# Patient Record
Sex: MALE | Race: BLACK OR AFRICAN AMERICAN | NOT HISPANIC OR LATINO | Employment: FULL TIME | URBAN - METROPOLITAN AREA
[De-identification: names, ages, dates, MRNs, and addresses within clinical notes are randomized per-mention and may not be internally consistent; named-entity substitution may affect disease eponyms.]

---

## 2023-09-22 ENCOUNTER — HOSPITAL ENCOUNTER (EMERGENCY)
Facility: CLINIC | Age: 23
Discharge: HOME | End: 2023-09-22
Admitting: PHYSICIAN ASSISTANT
Payer: COMMERCIAL

## 2023-09-22 VITALS
SYSTOLIC BLOOD PRESSURE: 125 MMHG | HEIGHT: 71 IN | BODY MASS INDEX: 23.8 KG/M2 | HEART RATE: 88 BPM | TEMPERATURE: 98.2 F | OXYGEN SATURATION: 99 % | WEIGHT: 170 LBS | DIASTOLIC BLOOD PRESSURE: 66 MMHG | RESPIRATION RATE: 18 BRPM

## 2023-09-22 DIAGNOSIS — J03.90 ACUTE TONSILLITIS, UNSPECIFIED ETIOLOGY: ICD-10-CM

## 2023-09-22 DIAGNOSIS — R30.0 DYSURIA: ICD-10-CM

## 2023-09-22 LAB
ALBUMIN UR-MCNC: 10 MG/DL
APPEARANCE UR: CLEAR
BILIRUB UR QL STRIP: NEGATIVE
COLOR UR AUTO: YELLOW
GLUCOSE UR STRIP-MCNC: NEGATIVE MG/DL
GROUP A STREP BY PCR: NOT DETECTED
HGB UR QL STRIP: NEGATIVE
KETONES UR STRIP-MCNC: NEGATIVE MG/DL
LEUKOCYTE ESTERASE UR QL STRIP: NEGATIVE
MUCOUS THREADS #/AREA URNS LPF: PRESENT /LPF
NITRATE UR QL: NEGATIVE
PH UR STRIP: 6 [PH] (ref 5–7)
RBC URINE: 1 /HPF
SP GR UR STRIP: 1.03 (ref 1–1.03)
UROBILINOGEN UR STRIP-MCNC: <2 MG/DL
WBC URINE: <1 /HPF

## 2023-09-22 PROCEDURE — 87591 N.GONORRHOEAE DNA AMP PROB: CPT | Performed by: PHYSICIAN ASSISTANT

## 2023-09-22 PROCEDURE — 87491 CHLMYD TRACH DNA AMP PROBE: CPT | Performed by: PHYSICIAN ASSISTANT

## 2023-09-22 PROCEDURE — 99284 EMERGENCY DEPT VISIT MOD MDM: CPT

## 2023-09-22 PROCEDURE — 250N000011 HC RX IP 250 OP 636: Performed by: PHYSICIAN ASSISTANT

## 2023-09-22 PROCEDURE — 87637 SARSCOV2&INF A&B&RSV AMP PRB: CPT | Performed by: PHYSICIAN ASSISTANT

## 2023-09-22 PROCEDURE — 81001 URINALYSIS AUTO W/SCOPE: CPT | Performed by: EMERGENCY MEDICINE

## 2023-09-22 PROCEDURE — 96372 THER/PROPH/DIAG INJ SC/IM: CPT | Performed by: PHYSICIAN ASSISTANT

## 2023-09-22 PROCEDURE — 87651 STREP A DNA AMP PROBE: CPT | Performed by: EMERGENCY MEDICINE

## 2023-09-22 RX ADMIN — PENICILLIN G BENZATHINE 1.2 MILLION UNITS: 1200000 INJECTION, SUSPENSION INTRAMUSCULAR at 23:08

## 2023-09-22 ASSESSMENT — ENCOUNTER SYMPTOMS
FEVER: 1
SHORTNESS OF BREATH: 0
VOMITING: 0
NAUSEA: 0
HEMATURIA: 0
SORE THROAT: 1
VOICE CHANGE: 0
FREQUENCY: 1
FLANK PAIN: 0
DYSURIA: 1
MYALGIAS: 1
COUGH: 0
TROUBLE SWALLOWING: 0
ABDOMINAL PAIN: 0

## 2023-09-23 LAB
FLUAV RNA SPEC QL NAA+PROBE: NEGATIVE
FLUBV RNA RESP QL NAA+PROBE: NEGATIVE
RSV RNA SPEC NAA+PROBE: NEGATIVE
SARS-COV-2 RNA RESP QL NAA+PROBE: NEGATIVE

## 2023-09-23 NOTE — ED NOTES
Pt was given Pen shot and will remain in the Waiting room for 15 min for eval of any allergy to medication. He will then be able to leave. Pt was given nathan SONG RN

## 2023-09-23 NOTE — ED TRIAGE NOTES
Pt presents to the ED with c/o sore throat that has been going on since Monday. Pt reports that he went to , had a strep test done, and was told it was negative. Pt reports hx of strep throat, states that it feels the same. Pt also concerted of UTI.      Triage Assessment       Row Name 09/22/23 2017       Triage Assessment (Adult)    Airway WDL WDL       Respiratory WDL    Respiratory WDL WDL       Skin Circulation/Temperature WDL    Skin Circulation/Temperature WDL WDL       Cardiac WDL    Cardiac WDL WDL       Peripheral/Neurovascular WDL    Peripheral Neurovascular WDL WDL       Cognitive/Neuro/Behavioral WDL    Cognitive/Neuro/Behavioral WDL WDL

## 2023-09-23 NOTE — ED PROVIDER NOTES
Emergency Department Encounter   NAME: Pino Parker ; AGE: 23 year old male ; YOB: 2000 ; MRN: [id number] ; PCP: No Ref-Primary, Physician   ED PROVIDER: Violet Garcia PA-C    Evaluation Date & Time:   No admission date for patient encounter.    CHIEF COMPLAINT:  Pharyngitis      Impression and Plan   MDM:   Pino Parker is a 23 year old male with a pertinent history of compression fracture of T1 vertebrae, tic, who presents to the ED by private vehicle for evaluation of sore throat and dysuria.  The patient presents to the emergency department for evaluation of 1 week of sore throat which she states feels similar to previous strep pharyngitis.  He was seen in urgent care yesterday and per chart review had a negative strep test.  This was repeated by our triage staff which again returned negative.  Discussed this with the patient.  He does appear to have tonsillitis on exam, and given this did offer him IM Bicillin which she would like to move forward with.  There is no evidence of peritonsillar abscess and certainly nothing to suggest more sinister pathology such as retropharyngeal abscess, epiglottitis or bacterial tracheitis.  Did consider gonococcal tonsillitis, however less likely given negative gonorrhea and Chlamydia urine testing yesterday in clinic.  We will send oral swab.  He is experiencing dysuria after having unprotected insertive anal intercourse with his female partner.  UA shows some mucus though there is no evidence of leukocytosis, nitrates, or bacteria to suggest UTI.  He is not having any testicular pain or scrotal swelling -no concern for epididymitis, orchitis, or torsion.  He denies any rashes or lesions -no concern for herpes.  Patient is planning on having a full STI screening on Thursday which I feel is appropriate.  No indication for more emergent work-up at this time.  We discussed the importance of follow-up as scheduled, concerning signs and symptoms to return  to the ER, and he verbalized understanding.  Discharged home in stable condition.    *Patient examination and workup was initiated in triage due to inpatient hospital and Emergency Department bed shortage resulting in long waiting room wait times. Patient and/or guardian's consent was obtained.       Medical Decision Making    History:  Supplemental history from: Documented in chart, if applicable  External Record(s) reviewed: Outpatient Record:  visit from yesterday    Work Up:  Chart documentation includes differential considered and any EKGs or imaging independently interpreted by provider, where specified.  In additional to work up documented, I considered the following work up: Documented in chart, if applicable.    External consultation:  Discussion of management with another provider: Documented in chart, if applicable    Complicating factors:  Care impacted by chronic illness: N/A  Care affected by social determinants of health: N/A    Disposition considerations: Discharge. I prescribed additional prescription strength medication(s) as charted. See documentation for any additional details.      ED COURSE:  10:08 PM I met and introduced myself to the patient. I gathered initial history and performed my physical exam. We discussed results, discharge, follow up, and reasons to return to the ED.     At the conclusion of the encounter I discussed the results of all the tests and the disposition. The questions were answered. The patient or family acknowledged understanding and was agreeable with the care plan.    FINAL IMPRESSION:    ICD-10-CM    1. Acute tonsillitis, unspecified etiology  J03.90       2. Dysuria  R30.0             MEDICATIONS GIVEN IN THE EMERGENCY DEPARTMENT:  Medications   penicillin G benzathine (BICILLIN L-A) injection 1.2 Million Units (has no administration in time range)         NEW PRESCRIPTIONS STARTED AT TODAY'S ED VISIT:  New Prescriptions    No medications on file         HPI  "  Patient information was obtained from: Patient    Use of Intrepreter: N/A     Pino Parker is a 23 year old male with a pertinent history of compression fracture of T1 vertebrae, tic, who presents to the ED by private vehicle for evaluation of sore throat and dysuria.     The patient presents to the emergency department for evaluation of sore throat for 1 week.  He states he has had strep throat multiple times in the past and his symptoms feel similar.  He felt feverish initially and had body aches which have since resolved.  He has pain with swallowing, his throat feels dry in the morning, though tends to feel better throughout the day.  He was at urgent care yesterday and had a negative strep test though wanted this repeated today.  He has not had a COVID swab.    Additionally, patient reports that he had unprotected insertive anal intercourse with his female partner, and has had burning with urination ever since.  He provided a urine sample at your urgent care yesterday which was negative for gonorrhea and chlamydia and he is set up for a \"full STI panel\" on Thursday.  He has not had nausea, vomiting, belly or back pain.  He denies any genital rashes, testicular pain or swelling.    REVIEW OF SYSTEMS:  Review of Systems   Constitutional:  Positive for fever (subjective; resolved).   HENT:  Positive for sore throat. Negative for ear pain, trouble swallowing and voice change.    Respiratory:  Negative for cough and shortness of breath.    Cardiovascular:  Negative for chest pain.   Gastrointestinal:  Negative for abdominal pain, nausea and vomiting.   Genitourinary:  Positive for dysuria and frequency. Negative for flank pain, genital sores, hematuria, penile discharge, scrotal swelling and testicular pain.   Musculoskeletal:  Positive for myalgias (resolved).         Medical History     No past medical history on file.    No past surgical history on file.    No family history on file.         No current " "outpatient medications on file.        Physical Exam     First Vitals:  Patient Vitals for the past 24 hrs:   BP Temp Temp src Pulse Resp SpO2 Height Weight   09/22/23 2015 116/74 98.2  F (36.8  C) Temporal 71 18 97 % 1.803 m (5' 11\") 77.1 kg (170 lb)         PHYSICAL EXAM:   Physical Exam  Vitals and nursing note reviewed.   Constitutional:       General: He is not in acute distress.     Appearance: He is well-developed. He is not ill-appearing, toxic-appearing or diaphoretic.   HENT:      Head: Normocephalic and atraumatic.      Mouth/Throat:      Mouth: Mucous membranes are moist.      Comments: +1 bilateral tonsillar edema with exudates present.  Uvula is midline without deviation and there is posterior oropharynx erythema.  No asymmetric soft palate swelling.  Normal phonation.  Handling secretions and speaking in full sentences.  No trismus.  Bilateral submandibular adenopathy likely reactive.  Cardiovascular:      Rate and Rhythm: Normal rate and regular rhythm.      Heart sounds: Normal heart sounds.   Pulmonary:      Effort: Pulmonary effort is normal.      Breath sounds: Normal breath sounds.   Abdominal:      General: There is no distension.      Palpations: Abdomen is soft.      Tenderness: There is no abdominal tenderness. There is no guarding or rebound.      Comments: No CVA tenderness.   Musculoskeletal:      Cervical back: Normal range of motion and neck supple.   Neurological:      Mental Status: He is alert.             Results     LAB:  All pertinent labs reviewed and interpreted  Labs Ordered and Resulted from Time of ED Arrival to Time of ED Departure   ROUTINE UA WITH MICROSCOPIC REFLEX TO CULTURE - Abnormal       Result Value    Color Urine Yellow      Appearance Urine Clear      Glucose Urine Negative      Bilirubin Urine Negative      Ketones Urine Negative      Specific Gravity Urine 1.029      Blood Urine Negative      pH Urine 6.0      Protein Albumin Urine 10 (*)     Urobilinogen Urine " <2.0      Nitrite Urine Negative      Leukocyte Esterase Urine Negative      Mucus Urine Present (*)     RBC Urine 1      WBC Urine <1     GROUP A STREPTOCOCCUS PCR THROAT SWAB - Normal    Group A strep by PCR Not Detected     INFLUENZA A/B, RSV, & SARS-COV2 PCR   CHLAMYDIA TRACHOMATIS/NEISSERIA GONORRHOEAE BY PCR       RADIOLOGY:  No orders to display           Violet Garcia PA-C   Emergency Medicine   [facility] EMERGENCY ROOM       Violet Garcia PA-C  09/22/23 0544

## 2023-09-23 NOTE — DISCHARGE INSTRUCTIONS
As we discussed, we have treated your tonsil infection with a dose of penicillin here in the emergency department.  We will call you if your COVID-19, influenza, or sexual transmitted infection throat swab come back positive.    Please follow-up as scheduled on Thursday to have further sexual transmitted infection testing.    If it anytime you develop fever, inability to to swallow, drooling, difficulty breathing, back or belly pain, blood in your urine, testicular pain, or any new or concerning symptoms please return to the ER for further evaluation.

## 2023-09-24 LAB
C TRACH DNA SPEC QL PROBE+SIG AMP: NEGATIVE
N GONORRHOEA DNA SPEC QL NAA+PROBE: NEGATIVE